# Patient Record
Sex: MALE | Race: WHITE | Employment: FULL TIME | URBAN - METROPOLITAN AREA
[De-identification: names, ages, dates, MRNs, and addresses within clinical notes are randomized per-mention and may not be internally consistent; named-entity substitution may affect disease eponyms.]

---

## 2018-03-07 ENCOUNTER — HOSPITAL ENCOUNTER (OUTPATIENT)
Dept: GENERAL RADIOLOGY | Age: 43
Discharge: HOME | End: 2018-03-08

## 2018-03-07 ENCOUNTER — HOSPITAL ENCOUNTER (OUTPATIENT)
Dept: ULTRASOUND IMAGING | Age: 43
Discharge: HOME | End: 2018-03-07

## 2018-03-07 DIAGNOSIS — R60.0 LOCALIZED EDEMA: ICD-10-CM

## 2018-03-15 ENCOUNTER — HOSPITAL ENCOUNTER (OUTPATIENT)
Dept: MRI IMAGING | Age: 43
Discharge: HOME | End: 2018-03-15

## 2018-03-15 DIAGNOSIS — M71.22 BAKER CYST, LEFT: ICD-10-CM

## 2018-03-15 DIAGNOSIS — M25.562 ACUTE PAIN OF LEFT KNEE: ICD-10-CM

## 2018-03-23 ENCOUNTER — OFFICE VISIT (OUTPATIENT)
Dept: ORTHOPEDIC SURGERY | Age: 43
End: 2018-03-23

## 2018-03-23 VITALS
SYSTOLIC BLOOD PRESSURE: 124 MMHG | WEIGHT: 142 LBS | HEIGHT: 68 IN | HEART RATE: 74 BPM | BODY MASS INDEX: 21.52 KG/M2 | DIASTOLIC BLOOD PRESSURE: 80 MMHG

## 2018-03-23 DIAGNOSIS — M25.562 LEFT KNEE PAIN, UNSPECIFIED CHRONICITY: Primary | ICD-10-CM

## 2018-03-23 PROCEDURE — 99203 OFFICE O/P NEW LOW 30 MIN: CPT | Performed by: ORTHOPAEDIC SURGERY

## 2018-03-23 NOTE — PROGRESS NOTES
Chief Complaint   Patient presents with    New Patient     Left Knee: Has a knot in the posterior aspect of his knee; had a MRI done 315/18 and a Duplex Venous test done 3/7/18- no DVT        HISTORY OF PRESENT ILLNESS    Sarah Proctor is a 36 y.o. male. Presents for evaluation of His left knee. He states over the last few months he started noticing a small prominence behind the lateral aspect of his left knee. Or time has gotten somewhat larger. He did notice some achy pain in his calf as well as posterior aspect of his thigh. He denies any injury to his knee or any recent intervention. He saw his primary care doctor who ordered an MRI of his knee as well as a venous duplex ultrasound to evaluate. He is here today to review these findings as well as discuss further treatment options. He says he is having some discomfort for the most part it doesn't hurt unless he is running. He is unsure if it is changed in size. He denies any pulsation or color change. He denies a history of malignancy. He denies any constitutional symptoms at this time. Activities/occupation: Runs a gas station    PAST MEDICAL/SURGICAL HISTORY     No past medical history on file. No past surgical history on file. Social History     Social History    Marital status:      Spouse name: N/A    Number of children: N/A    Years of education: N/A     Occupational History    Not on file. Social History Main Topics    Smoking status: Former Smoker    Smokeless tobacco: Never Used    Alcohol use Not on file    Drug use: Unknown    Sexual activity: Not on file     Other Topics Concern    Not on file     Social History Narrative    No narrative on file       No family history on file. REVIEW OF SYSTEMS  Pertinent items are noted in HPI  Review of systems reviewed from Patient History Form dated on 3/23/18 and available in the patient's chart under the Media tab.        PHYSICAL EXAM    Vitals:    03/23/18 1016

## 2018-04-12 ENCOUNTER — OFFICE VISIT (OUTPATIENT)
Dept: ORTHOPEDIC SURGERY | Age: 43
End: 2018-04-12

## 2018-04-12 VITALS
DIASTOLIC BLOOD PRESSURE: 86 MMHG | BODY MASS INDEX: 21.52 KG/M2 | HEIGHT: 68 IN | SYSTOLIC BLOOD PRESSURE: 149 MMHG | WEIGHT: 141.98 LBS | HEART RATE: 73 BPM

## 2018-04-12 DIAGNOSIS — M67.40 GANGLION CYST: Primary | ICD-10-CM

## 2018-04-12 PROCEDURE — 99213 OFFICE O/P EST LOW 20 MIN: CPT | Performed by: ORTHOPAEDIC SURGERY

## 2019-01-24 ENCOUNTER — HOSPITAL ENCOUNTER (OUTPATIENT)
Dept: GENERAL RADIOLOGY | Age: 44
Discharge: HOME | End: 2019-01-24
Payer: COMMERCIAL

## 2019-01-24 DIAGNOSIS — M25.541 ARTHRALGIA OF RIGHT HAND: ICD-10-CM

## 2019-01-24 DIAGNOSIS — R52 PAIN: ICD-10-CM
